# Patient Record
Sex: FEMALE | Race: BLACK OR AFRICAN AMERICAN
[De-identification: names, ages, dates, MRNs, and addresses within clinical notes are randomized per-mention and may not be internally consistent; named-entity substitution may affect disease eponyms.]

---

## 2018-09-13 ENCOUNTER — HOSPITAL ENCOUNTER (EMERGENCY)
Dept: HOSPITAL 5 - ED | Age: 58
Discharge: OTHER INSTITUTION | End: 2018-09-13
Payer: SELF-PAY

## 2018-09-13 VITALS — SYSTOLIC BLOOD PRESSURE: 139 MMHG | DIASTOLIC BLOOD PRESSURE: 91 MMHG

## 2018-09-13 DIAGNOSIS — I60.9: Primary | ICD-10-CM

## 2018-09-13 DIAGNOSIS — I61.8: ICD-10-CM

## 2018-09-13 DIAGNOSIS — I10: ICD-10-CM

## 2018-09-13 LAB
ALBUMIN SERPL-MCNC: 4.1 G/DL (ref 3.9–5)
ALT SERPL-CCNC: 31 UNITS/L (ref 7–56)
APTT BLD: 24.1 SEC. (ref 24.2–36.6)
BAND NEUTROPHILS # (MANUAL): 2 K/MM3
BENZODIAZEPINES SCREEN,URINE: (no result)
BILIRUB UR QL STRIP: (no result)
BLOOD UR QL VISUAL: (no result)
BUN SERPL-MCNC: 13 MG/DL (ref 7–17)
BUN/CREAT SERPL: 22 %
CALCIUM SERPL-MCNC: 9 MG/DL (ref 8.4–10.2)
HCT VFR BLD CALC: 41.3 % (ref 30.3–42.9)
HDLC SERPL-MCNC: 51 MG/DL (ref 40–59)
HEMOLYSIS INDEX: 46
HGB BLD-MCNC: 13.8 GM/DL (ref 10.1–14.3)
INR PPP: 0.95 (ref 0.87–1.13)
MCH RBC QN AUTO: 30 PG (ref 28–32)
MCHC RBC AUTO-ENTMCNC: 33 % (ref 30–34)
MCV RBC AUTO: 89 FL (ref 79–97)
METHADONE SCREEN,URINE: (no result)
MYELOCYTES # (MANUAL): 0 K/MM3
OPIATE SCREEN,URINE: (no result)
PH UR STRIP: 6 [PH] (ref 5–7)
PLATELET # BLD: 303 K/MM3 (ref 140–440)
PROMYELOCYTES # (MANUAL): 0 K/MM3
RBC # BLD AUTO: 4.63 M/MM3 (ref 3.65–5.03)
RBC #/AREA URNS HPF: 7 /HPF (ref 0–6)
TOTAL CELLS COUNTED BLD: 100
UROBILINOGEN UR-MCNC: < 2 MG/DL (ref ?–2)
WBC #/AREA URNS HPF: 2 /HPF (ref 0–6)

## 2018-09-13 PROCEDURE — 85610 PROTHROMBIN TIME: CPT

## 2018-09-13 PROCEDURE — 82550 ASSAY OF CK (CPK): CPT

## 2018-09-13 PROCEDURE — 81001 URINALYSIS AUTO W/SCOPE: CPT

## 2018-09-13 PROCEDURE — 85730 THROMBOPLASTIN TIME PARTIAL: CPT

## 2018-09-13 PROCEDURE — 70450 CT HEAD/BRAIN W/O DYE: CPT

## 2018-09-13 PROCEDURE — 96365 THER/PROPH/DIAG IV INF INIT: CPT

## 2018-09-13 PROCEDURE — 85670 THROMBIN TIME PLASMA: CPT

## 2018-09-13 PROCEDURE — 80053 COMPREHEN METABOLIC PANEL: CPT

## 2018-09-13 PROCEDURE — 86900 BLOOD TYPING SEROLOGIC ABO: CPT

## 2018-09-13 PROCEDURE — 99291 CRITICAL CARE FIRST HOUR: CPT

## 2018-09-13 PROCEDURE — 87070 CULTURE OTHR SPECIMN AEROBIC: CPT

## 2018-09-13 PROCEDURE — 93010 ELECTROCARDIOGRAM REPORT: CPT

## 2018-09-13 PROCEDURE — 96367 TX/PROPH/DG ADDL SEQ IV INF: CPT

## 2018-09-13 PROCEDURE — 82553 CREATINE MB FRACTION: CPT

## 2018-09-13 PROCEDURE — 86901 BLOOD TYPING SEROLOGIC RH(D): CPT

## 2018-09-13 PROCEDURE — 82803 BLOOD GASES ANY COMBINATION: CPT

## 2018-09-13 PROCEDURE — 71045 X-RAY EXAM CHEST 1 VIEW: CPT

## 2018-09-13 PROCEDURE — 85025 COMPLETE CBC W/AUTO DIFF WBC: CPT

## 2018-09-13 PROCEDURE — 80061 LIPID PANEL: CPT

## 2018-09-13 PROCEDURE — 80307 DRUG TEST PRSMV CHEM ANLYZR: CPT

## 2018-09-13 PROCEDURE — 86850 RBC ANTIBODY SCREEN: CPT

## 2018-09-13 PROCEDURE — 31500 INSERT EMERGENCY AIRWAY: CPT

## 2018-09-13 PROCEDURE — 94002 VENT MGMT INPAT INIT DAY: CPT

## 2018-09-13 PROCEDURE — 85007 BL SMEAR W/DIFF WBC COUNT: CPT

## 2018-09-13 PROCEDURE — 84702 CHORIONIC GONADOTROPIN TEST: CPT

## 2018-09-13 PROCEDURE — 36415 COLL VENOUS BLD VENIPUNCTURE: CPT

## 2018-09-13 PROCEDURE — 87205 SMEAR GRAM STAIN: CPT

## 2018-09-13 PROCEDURE — 93005 ELECTROCARDIOGRAM TRACING: CPT

## 2018-09-13 PROCEDURE — 84484 ASSAY OF TROPONIN QUANT: CPT

## 2018-09-13 NOTE — CAT SCAN REPORT
FINAL REPORT



EXAM:  CT HEAD/BRAIN WO CON



HISTORY:  Stroke symptoms 



TECHNIQUE:  Routine axial imaging was obtained of the brain

without IV contrast. There are no previous studies available for

comparison.



FINDINGS:  

There is an extensive subarachnoid hemorrhage involving all the

cisterns and fissures. There is asymmetric amount of blood in the

right sylvian fissure. There is a parenchymal hemorrhage in the

right frontal temporal region of the brain measuring 3.4 cm x 3.1

cm. There is acute blood throughout the entire ventricular system

with dilatation of ventricular system compatible with

communicating hydrocephalus. There is right to left subfalcine

shift. The actual measurement is difficult to assess because of

the tilting of the head in the gantry. There is no extra-axial

blood.



The visualized sinuses are clear. The mastoid air cells are well

pneumatized. There is no evidence of skull fracture.



IMPRESSION:  

Extensive subarachnoid hemorrhage with intraventricular blood and

developing communicating hydrocephalus.



Parenchymal hemorrhage in the right frontal temporal region of

the brain noted also. It is uncertain whether the parenchymal

hemorrhage is the primary cause of the remaining hemorrhage or is

a secondary hemorrhage related to a rupture aneurysm, possibly in

the distribution of the right middle cerebral artery.



The findings were discussed with Dr. Paula at 4:43 a.m.  on

09/13/2018.

## 2018-09-13 NOTE — EMERGENCY DEPARTMENT REPORT
ED Neuro Deficit HPI





- General


Chief Complaint: Altered Mental Status


Stated Complaint: UNRESPONSIVE


Time Seen by Provider: 09/13/18 03:21


Source: family, EMS


Mode of arrival: Stretcher


Limitations: Language Barrier (patient speak [place]ense), Altered Mental Status

, Other (comatose)





- History of Present Illness


Severity: severe





- Related Data


Allergies/Adverse Reactions: 


 Allergies











Allergy/AdvReac Type Severity Reaction Status Date / Time


 


No Known Allergies Allergy   Unverified 09/13/18 03:27














ED Review of Systems


ROS: 


Stated complaint: UNRESPONSIVE


Other details as noted in HPI








ED Past Medical Hx





- Past Medical History


Hx Hypertension: Yes





- Surgical History


Past Surgical History?: No





- Social History


Smoking Status: Never Smoker


Substance Use Type: None





ED Neuro Physical Exam





- General


Limitations: Language Barrier (patient speak [place]), Altered Mental Status

, Other (comatose)


General appearance: obtunded, in distress, other (flexor posturing in upper 

extremities, extensor posturing in feet/legs, sonorous respirations)


Suspected Stroke: Yes





- NIHSS


Assessment Interval: Baseline


1a. Level of Consciousness: coma/unresponsive


1b. LOC Questions: answers no questions correctly


1c. LOC Commands: performs no tasks correctly


2. Best Gaze: forced deviation


3. Visual: bilateral hemianopia


4. Facial Palsy: bilateral complete paralysis


5b. Motor Arm Right: some gravity effort


5a. Motor Arm Left: some gravity effort


6a. Motor Leg Left: no movement


6b. Motor Leg Right: no movement


7. Limb Ataxia: present 2 limbs


8. Sensory: coma/unresponsive


9. Best Language: coma/unresponsive


10. Dysarthria: mute/anarrthric


11. Extinction/Inattention: complete neglect


Total Score: 38


Stroke Severity: Severe Stroke





ED Course





 Vital Signs











  09/13/18 09/13/18 09/13/18





  03:08 03:10 03:13


 


Temperature   98 F


 


Pulse Rate 72  104 H


 


Respiratory 20 18 20





Rate   


 


Blood Pressure   131/86


 


O2 Sat by Pulse 97 100 100





Oximetry   














  09/13/18 09/13/18 09/13/18





  03:16 03:30 03:46


 


Temperature   


 


Pulse Rate 113 H 94 H 103 H


 


Respiratory 28 H 24 25 H





Rate   


 


Blood Pressure 131/86 174/104 141/93


 


O2 Sat by Pulse 99 100 100





Oximetry   














  09/13/18





  04:00


 


Temperature 


 


Pulse Rate 115 H


 


Respiratory 24





Rate 


 


Blood Pressure 141/93


 


O2 Sat by Pulse 100





Oximetry 














- Lab Data


Result diagrams: 


 09/13/18 03:30





 09/13/18 03:30





 Lab Results











  09/13/18 09/13/18 09/13/18 Range/Units





  03:30 03:30 03:30 


 


WBC  28.9 H    (4.5-11.0)  K/mm3


 


RBC  4.63    (3.65-5.03)  M/mm3


 


Hgb  13.8    (10.1-14.3)  gm/dl


 


Hct  41.3    (30.3-42.9)  %


 


MCV  89    (79-97)  fl


 


MCH  30    (28-32)  pg


 


MCHC  33    (30-34)  %


 


RDW  12.7 L    (13.2-15.2)  %


 


Plt Count  303    (140-440)  K/mm3


 


PT   13.1   (12.2-14.9)  Sec.


 


INR   0.95   (0.87-1.13)  


 


APTT   24.1 L   (24.2-36.6)  Sec.


 


Thrombin Time   17.1   (15.1-19.6)  Sec.


 


POC ABG pH     (7.35-7.45)  


 


POC ABG pCO2     (35-45)  


 


POC ABG pO2     ()  


 


POC ABG HCO3     


 


POC ABG Total CO2     


 


POC ABG O2 Sat     


 


POC ABG Base Excess     


 


FiO2     %


 


Sodium     (137-145)  mmol/L


 


Potassium     (3.6-5.0)  mmol/L


 


Chloride     ()  mmol/L


 


Carbon Dioxide     (22-30)  mmol/L


 


Anion Gap     mmol/L


 


BUN     (7-17)  mg/dL


 


Creatinine     (0.7-1.2)  mg/dL


 


Estimated GFR     ml/min


 


BUN/Creatinine Ratio     %


 


Glucose     ()  mg/dL


 


Calcium     (8.4-10.2)  mg/dL


 


Total Bilirubin     (0.1-1.2)  mg/dL


 


ALT     (7-56)  units/L


 


Alkaline Phosphatase     ()  units/L


 


Total Creatine Kinase    124  ()  units/L


 


CK-MB (CK-2)    5.8 H  (0.0-4.0)  ng/mL


 


CK-MB (CK-2) Rel Index    4.6 H  (0-4)  


 


Troponin T    0.070 H  (0.00-0.029)  ng/mL


 


Total Protein     (6.3-8.2)  g/dL


 


Albumin     (3.9-5)  g/dL


 


Albumin/Globulin Ratio     %


 


HCG, Quant     (0-4)  mIU/mL


 


Urine Color     (Yellow)  


 


Urine Turbidity     (Clear)  


 


Urine pH     (5.0-7.0)  


 


Ur Specific Gravity     (1.003-1.030)  


 


Urine Protein     (Negative)  mg/dL


 


Urine Glucose (UA)     (Negative)  mg/dL


 


Urine Ketones     (Negative)  mg/dL


 


Urine Blood     (Negative)  


 


Urine Nitrite     (Negative)  


 


Urine Bilirubin     (Negative)  


 


Urine Urobilinogen     (<2.0)  mg/dL


 


Ur Leukocyte Esterase     (Negative)  


 


Urine WBC (Auto)     (0.0-6.0)  /HPF


 


Urine RBC (Auto)     (0.0-6.0)  /HPF


 


Urine Opiates Screen     


 


Urine Methadone Screen     


 


Ur Barbiturates Screen     


 


Ur Phencyclidine Scrn     


 


Ur Amphetamines Screen     


 


U Benzodiazepines Scrn     


 


Urine Cocaine Screen     


 


U Marijuana (THC) Screen     


 


Drugs of Abuse Note     














  09/13/18 09/13/18 09/13/18 Range/Units





  03:30 03:30 03:40 


 


WBC     (4.5-11.0)  K/mm3


 


RBC     (3.65-5.03)  M/mm3


 


Hgb     (10.1-14.3)  gm/dl


 


Hct     (30.3-42.9)  %


 


MCV     (79-97)  fl


 


MCH     (28-32)  pg


 


MCHC     (30-34)  %


 


RDW     (13.2-15.2)  %


 


Plt Count     (140-440)  K/mm3


 


PT     (12.2-14.9)  Sec.


 


INR     (0.87-1.13)  


 


APTT     (24.2-36.6)  Sec.


 


Thrombin Time     (15.1-19.6)  Sec.


 


POC ABG pH     (7.35-7.45)  


 


POC ABG pCO2     (35-45)  


 


POC ABG pO2     ()  


 


POC ABG HCO3     


 


POC ABG Total CO2     


 


POC ABG O2 Sat     


 


POC ABG Base Excess     


 


FiO2     %


 


Sodium  139    (137-145)  mmol/L


 


Potassium  3.4 L    (3.6-5.0)  mmol/L


 


Chloride  97.4 L    ()  mmol/L


 


Carbon Dioxide  17 L    (22-30)  mmol/L


 


Anion Gap  28    mmol/L


 


BUN  13    (7-17)  mg/dL


 


Creatinine  0.6 L    (0.7-1.2)  mg/dL


 


Estimated GFR  > 60    ml/min


 


BUN/Creatinine Ratio  22    %


 


Glucose  249 H    ()  mg/dL


 


Calcium  9.0    (8.4-10.2)  mg/dL


 


Total Bilirubin  0.30    (0.1-1.2)  mg/dL


 


ALT  31    (7-56)  units/L


 


Alkaline Phosphatase  104    ()  units/L


 


Total Creatine Kinase     ()  units/L


 


CK-MB (CK-2)     (0.0-4.0)  ng/mL


 


CK-MB (CK-2) Rel Index     (0-4)  


 


Troponin T     (0.00-0.029)  ng/mL


 


Total Protein  8.1    (6.3-8.2)  g/dL


 


Albumin  4.1    (3.9-5)  g/dL


 


Albumin/Globulin Ratio  1.0    %


 


HCG, Quant   0.895   (0-4)  mIU/mL


 


Urine Color    Colorless  (Yellow)  


 


Urine Turbidity    Clear  (Clear)  


 


Urine pH    6.0  (5.0-7.0)  


 


Ur Specific Gravity    1.007  (1.003-1.030)  


 


Urine Protein    100 mg/dl  (Negative)  mg/dL


 


Urine Glucose (UA)    >=500  (Negative)  mg/dL


 


Urine Ketones    20  (Negative)  mg/dL


 


Urine Blood    Sm  (Negative)  


 


Urine Nitrite    Neg  (Negative)  


 


Urine Bilirubin    Neg  (Negative)  


 


Urine Urobilinogen    < 2.0  (<2.0)  mg/dL


 


Ur Leukocyte Esterase    Neg  (Negative)  


 


Urine WBC (Auto)    2.0  (0.0-6.0)  /HPF


 


Urine RBC (Auto)    7.0  (0.0-6.0)  /HPF


 


Urine Opiates Screen     


 


Urine Methadone Screen     


 


Ur Barbiturates Screen     


 


Ur Phencyclidine Scrn     


 


Ur Amphetamines Screen     


 


U Benzodiazepines Scrn     


 


Urine Cocaine Screen     


 


U Marijuana (THC) Screen     


 


Drugs of Abuse Note     














  09/13/18 09/13/18 Range/Units





  03:40 04:15 


 


WBC    (4.5-11.0)  K/mm3


 


RBC    (3.65-5.03)  M/mm3


 


Hgb    (10.1-14.3)  gm/dl


 


Hct    (30.3-42.9)  %


 


MCV    (79-97)  fl


 


MCH    (28-32)  pg


 


MCHC    (30-34)  %


 


RDW    (13.2-15.2)  %


 


Plt Count    (140-440)  K/mm3


 


PT    (12.2-14.9)  Sec.


 


INR    (0.87-1.13)  


 


APTT    (24.2-36.6)  Sec.


 


Thrombin Time    (15.1-19.6)  Sec.


 


POC ABG pH   7.442  (7.35-7.45)  


 


POC ABG pCO2   20.9 L  (35-45)  


 


POC ABG pO2   339 H  ()  


 


POC ABG HCO3   14.2  


 


POC ABG Total CO2   15  


 


POC ABG O2 Sat   100  


 


POC ABG Base Excess   -10  


 


FiO2   100  %


 


Sodium    (137-145)  mmol/L


 


Potassium    (3.6-5.0)  mmol/L


 


Chloride    ()  mmol/L


 


Carbon Dioxide    (22-30)  mmol/L


 


Anion Gap    mmol/L


 


BUN    (7-17)  mg/dL


 


Creatinine    (0.7-1.2)  mg/dL


 


Estimated GFR    ml/min


 


BUN/Creatinine Ratio    %


 


Glucose    ()  mg/dL


 


Calcium    (8.4-10.2)  mg/dL


 


Total Bilirubin    (0.1-1.2)  mg/dL


 


ALT    (7-56)  units/L


 


Alkaline Phosphatase    ()  units/L


 


Total Creatine Kinase    ()  units/L


 


CK-MB (CK-2)    (0.0-4.0)  ng/mL


 


CK-MB (CK-2) Rel Index    (0-4)  


 


Troponin T    (0.00-0.029)  ng/mL


 


Total Protein    (6.3-8.2)  g/dL


 


Albumin    (3.9-5)  g/dL


 


Albumin/Globulin Ratio    %


 


HCG, Quant    (0-4)  mIU/mL


 


Urine Color    (Yellow)  


 


Urine Turbidity    (Clear)  


 


Urine pH    (5.0-7.0)  


 


Ur Specific Gravity    (1.003-1.030)  


 


Urine Protein    (Negative)  mg/dL


 


Urine Glucose (UA)    (Negative)  mg/dL


 


Urine Ketones    (Negative)  mg/dL


 


Urine Blood    (Negative)  


 


Urine Nitrite    (Negative)  


 


Urine Bilirubin    (Negative)  


 


Urine Urobilinogen    (<2.0)  mg/dL


 


Ur Leukocyte Esterase    (Negative)  


 


Urine WBC (Auto)    (0.0-6.0)  /HPF


 


Urine RBC (Auto)    (0.0-6.0)  /HPF


 


Urine Opiates Screen  Presumptive negative   


 


Urine Methadone Screen  Presumptive negative   


 


Ur Barbiturates Screen  Presumptive negative   


 


Ur Phencyclidine Scrn  Presumptive negative   


 


Ur Amphetamines Screen  Presumptive negative   


 


U Benzodiazepines Scrn  Presumptive negative   


 


Urine Cocaine Screen  Presumptive negative   


 


U Marijuana (THC) Screen  Presumptive negative   


 


Drugs of Abuse Note  Disclamer   











Critical care attestation.: 


If time is entered above; I have spent that time in minutes in the direct care 

of this critically ill patient, excluding procedure time.








ED Disposition


Clinical Impression: 


 SAH (subarachnoid hemorrhage), Intraparenchymal hematoma of brain





Disposition: DC/TX-[facility]OTHER [facility]


Is pt being admited?: No


Does the pt Need Aspirin: No


Condition: Stable

## 2018-09-13 NOTE — EMERGENCY DEPARTMENT REPORT
ED Altered Mental Status HPI





- General


Stated Complaint: UNRESPONSIVE


Time Seen by Provider: 09/13/18 03:21


Source: family, EMS


Limitations: Language Barrier





- History of Present Illness


Initial Comments: 





Ms. Carl is 57 yo female who presents to ED via EMS for unresponsiveness.  

Patient has hx of HTN not treated.  Son was able to give hx.  Aunt called son 

to inform him that patient went to sleep and did not wake up.  Blood glucose 

238 according to EMS.  No change with naloxone administered.  No hx of tobacco 

or drug abuse. Patient does hard labor in factory.  patient is unresponsive 

comatose.  No response to internal stimuli.  Covered in urine.


MD Complaint: altered mental status, decreased responsiveness


Severity: severe


Context: unknown


Treatments Prior to Arrival: IV fluid, oxygen





- Related Data


 Allergies











Allergy/AdvReac Type Severity Reaction Status Date / Time


 


No Known Allergies Allergy   Unverified 09/13/18 03:27














ED Review of Systems


ROS: 


Stated complaint: UNRESPONSIVE


Other details as noted in HPI





Comment: Unobtainable due to pts medical conditions





ED Past Medical Hx





- Past Medical History


Hx Hypertension: Yes





- Family History


Family history: vascular disease (mother with CVA)





- Social History


Smoking Status: Never Smoker


Substance Use Type: None





ED Physical Exam





- General


Limitations: Language Barrier (patient speak [place]), Altered Mental Status

, Other (comatose)


General appearance: obtunded, in distress, other (flexor posturing in upper 

extremities, extensor posturing in feet/legs, sonorous respirations)





- Head


Head exam: Present: atraumatic, normocephalic





- Eye


Eye exam: Present: other (fixed unreactive pupils 5 mm bilaterally no lid 

reflex no accomodation)





- ENT


ENT exam: Present: normal orophraynx





- Neck


Neck exam: Present: normal inspection





- Respiratory


Respiratory exam: Present: rales, rhonchi, accessory muscle use





- Cardiovascular


Cardiovascular Exam: Present: normal rhythm, irregular rhythm.  Absent: 

systolic murmur, diastolic murmur





- GI/Abdominal


GI/Abdominal exam: Present: soft, other (no distention soft)





- Extremities Exam


Extremities exam: Present: other (extensor posturing)





- Neurological Exam


Neurological exam: Present: other (comatose)





- Psychiatric


Psychiatric exam: Present: other (comatose)





- Skin


Skin exam: Present: pallor, other





- Other


Other exam information: 





no response to sternal rub





ED Course


 Vital Signs











  09/13/18 09/13/18 09/13/18





  03:08 03:10 03:13


 


Temperature   98 F


 


Pulse Rate 72  104 H


 


Respiratory 20 18 20





Rate   


 


Blood Pressure   131/86


 


O2 Sat by Pulse 97 100 100





Oximetry   














  09/13/18 09/13/18 09/13/18





  03:16 03:30 03:46


 


Temperature   


 


Pulse Rate 113 H 94 H 103 H


 


Respiratory 28 H 24 25 H





Rate   


 


Blood Pressure 131/86 174/104 141/93


 


O2 Sat by Pulse 99 100 100





Oximetry   














  09/13/18





  04:00


 


Temperature 


 


Pulse Rate 115 H


 


Respiratory 24





Rate 


 


Blood Pressure 141/93


 


O2 Sat by Pulse 100





Oximetry 














- Intubation


Time Out Performed: Yes


Sedative: Etomidate


Mg Given: 20


Paralytic: Succinylcholine


Mg Given: 100


Laryngoscope: Abisai


Size: 4


ET Tube Size: 7.5


Tube Secured Depth (cm): 22


Tube Secured Location: teeth


Tube Placement Confirmation: visualized tube passing t, equal breath sounds 

bilat, no breath sounds over epi, confirmation by capnometr


Patient Tolerated Procedure: well


Intubation Complications: none





- Lab Data


Result diagrams: 


 09/13/18 03:30





 09/13/18 03:30


 Lab Results











  09/13/18 09/13/18 09/13/18 Range/Units





  03:30 03:30 03:30 


 


WBC  28.9 H    (4.5-11.0)  K/mm3


 


RBC  4.63    (3.65-5.03)  M/mm3


 


Hgb  13.8    (10.1-14.3)  gm/dl


 


Hct  41.3    (30.3-42.9)  %


 


MCV  89    (79-97)  fl


 


MCH  30    (28-32)  pg


 


MCHC  33    (30-34)  %


 


RDW  12.7 L    (13.2-15.2)  %


 


Plt Count  303    (140-440)  K/mm3


 


PT   13.1   (12.2-14.9)  Sec.


 


INR   0.95   (0.87-1.13)  


 


APTT   24.1 L   (24.2-36.6)  Sec.


 


Thrombin Time   17.1   (15.1-19.6)  Sec.


 


POC ABG pH     (7.35-7.45)  


 


POC ABG pCO2     (35-45)  


 


POC ABG pO2     ()  


 


POC ABG HCO3     


 


POC ABG Total CO2     


 


POC ABG O2 Sat     


 


POC ABG Base Excess     


 


FiO2     %


 


Sodium     (137-145)  mmol/L


 


Potassium     (3.6-5.0)  mmol/L


 


Chloride     ()  mmol/L


 


Carbon Dioxide     (22-30)  mmol/L


 


Anion Gap     mmol/L


 


BUN     (7-17)  mg/dL


 


Creatinine     (0.7-1.2)  mg/dL


 


Estimated GFR     ml/min


 


BUN/Creatinine Ratio     %


 


Glucose     ()  mg/dL


 


Calcium     (8.4-10.2)  mg/dL


 


Total Bilirubin     (0.1-1.2)  mg/dL


 


ALT     (7-56)  units/L


 


Alkaline Phosphatase     ()  units/L


 


Total Creatine Kinase    124  ()  units/L


 


CK-MB (CK-2)    5.8 H  (0.0-4.0)  ng/mL


 


CK-MB (CK-2) Rel Index    4.6 H  (0-4)  


 


Troponin T    0.070 H  (0.00-0.029)  ng/mL


 


Total Protein     (6.3-8.2)  g/dL


 


Albumin     (3.9-5)  g/dL


 


Albumin/Globulin Ratio     %


 


HCG, Quant     (0-4)  mIU/mL


 


Urine Color     (Yellow)  


 


Urine Turbidity     (Clear)  


 


Urine pH     (5.0-7.0)  


 


Ur Specific Gravity     (1.003-1.030)  


 


Urine Protein     (Negative)  mg/dL


 


Urine Glucose (UA)     (Negative)  mg/dL


 


Urine Ketones     (Negative)  mg/dL


 


Urine Blood     (Negative)  


 


Urine Nitrite     (Negative)  


 


Urine Bilirubin     (Negative)  


 


Urine Urobilinogen     (<2.0)  mg/dL


 


Ur Leukocyte Esterase     (Negative)  


 


Urine WBC (Auto)     (0.0-6.0)  /HPF


 


Urine RBC (Auto)     (0.0-6.0)  /HPF


 


Urine Opiates Screen     


 


Urine Methadone Screen     


 


Ur Barbiturates Screen     


 


Ur Phencyclidine Scrn     


 


Ur Amphetamines Screen     


 


U Benzodiazepines Scrn     


 


Urine Cocaine Screen     


 


U Marijuana (THC) Screen     


 


Drugs of Abuse Note     














  09/13/18 09/13/18 09/13/18 Range/Units





  03:30 03:30 03:40 


 


WBC     (4.5-11.0)  K/mm3


 


RBC     (3.65-5.03)  M/mm3


 


Hgb     (10.1-14.3)  gm/dl


 


Hct     (30.3-42.9)  %


 


MCV     (79-97)  fl


 


MCH     (28-32)  pg


 


MCHC     (30-34)  %


 


RDW     (13.2-15.2)  %


 


Plt Count     (140-440)  K/mm3


 


PT     (12.2-14.9)  Sec.


 


INR     (0.87-1.13)  


 


APTT     (24.2-36.6)  Sec.


 


Thrombin Time     (15.1-19.6)  Sec.


 


POC ABG pH     (7.35-7.45)  


 


POC ABG pCO2     (35-45)  


 


POC ABG pO2     ()  


 


POC ABG HCO3     


 


POC ABG Total CO2     


 


POC ABG O2 Sat     


 


POC ABG Base Excess     


 


FiO2     %


 


Sodium  139    (137-145)  mmol/L


 


Potassium  3.4 L    (3.6-5.0)  mmol/L


 


Chloride  97.4 L    ()  mmol/L


 


Carbon Dioxide  17 L    (22-30)  mmol/L


 


Anion Gap  28    mmol/L


 


BUN  13    (7-17)  mg/dL


 


Creatinine  0.6 L    (0.7-1.2)  mg/dL


 


Estimated GFR  > 60    ml/min


 


BUN/Creatinine Ratio  22    %


 


Glucose  249 H    ()  mg/dL


 


Calcium  9.0    (8.4-10.2)  mg/dL


 


Total Bilirubin  0.30    (0.1-1.2)  mg/dL


 


ALT  31    (7-56)  units/L


 


Alkaline Phosphatase  104    ()  units/L


 


Total Creatine Kinase     ()  units/L


 


CK-MB (CK-2)     (0.0-4.0)  ng/mL


 


CK-MB (CK-2) Rel Index     (0-4)  


 


Troponin T     (0.00-0.029)  ng/mL


 


Total Protein  8.1    (6.3-8.2)  g/dL


 


Albumin  4.1    (3.9-5)  g/dL


 


Albumin/Globulin Ratio  1.0    %


 


HCG, Quant   0.895   (0-4)  mIU/mL


 


Urine Color    Colorless  (Yellow)  


 


Urine Turbidity    Clear  (Clear)  


 


Urine pH    6.0  (5.0-7.0)  


 


Ur Specific Gravity    1.007  (1.003-1.030)  


 


Urine Protein    100 mg/dl  (Negative)  mg/dL


 


Urine Glucose (UA)    >=500  (Negative)  mg/dL


 


Urine Ketones    20  (Negative)  mg/dL


 


Urine Blood    Sm  (Negative)  


 


Urine Nitrite    Neg  (Negative)  


 


Urine Bilirubin    Neg  (Negative)  


 


Urine Urobilinogen    < 2.0  (<2.0)  mg/dL


 


Ur Leukocyte Esterase    Neg  (Negative)  


 


Urine WBC (Auto)    2.0  (0.0-6.0)  /HPF


 


Urine RBC (Auto)    7.0  (0.0-6.0)  /HPF


 


Urine Opiates Screen     


 


Urine Methadone Screen     


 


Ur Barbiturates Screen     


 


Ur Phencyclidine Scrn     


 


Ur Amphetamines Screen     


 


U Benzodiazepines Scrn     


 


Urine Cocaine Screen     


 


U Marijuana (THC) Screen     


 


Drugs of Abuse Note     














  09/13/18 09/13/18 Range/Units





  03:40 04:15 


 


WBC    (4.5-11.0)  K/mm3


 


RBC    (3.65-5.03)  M/mm3


 


Hgb    (10.1-14.3)  gm/dl


 


Hct    (30.3-42.9)  %


 


MCV    (79-97)  fl


 


MCH    (28-32)  pg


 


MCHC    (30-34)  %


 


RDW    (13.2-15.2)  %


 


Plt Count    (140-440)  K/mm3


 


PT    (12.2-14.9)  Sec.


 


INR    (0.87-1.13)  


 


APTT    (24.2-36.6)  Sec.


 


Thrombin Time    (15.1-19.6)  Sec.


 


POC ABG pH   7.442  (7.35-7.45)  


 


POC ABG pCO2   20.9 L  (35-45)  


 


POC ABG pO2   339 H  ()  


 


POC ABG HCO3   14.2  


 


POC ABG Total CO2   15  


 


POC ABG O2 Sat   100  


 


POC ABG Base Excess   -10  


 


FiO2   100  %


 


Sodium    (137-145)  mmol/L


 


Potassium    (3.6-5.0)  mmol/L


 


Chloride    ()  mmol/L


 


Carbon Dioxide    (22-30)  mmol/L


 


Anion Gap    mmol/L


 


BUN    (7-17)  mg/dL


 


Creatinine    (0.7-1.2)  mg/dL


 


Estimated GFR    ml/min


 


BUN/Creatinine Ratio    %


 


Glucose    ()  mg/dL


 


Calcium    (8.4-10.2)  mg/dL


 


Total Bilirubin    (0.1-1.2)  mg/dL


 


ALT    (7-56)  units/L


 


Alkaline Phosphatase    ()  units/L


 


Total Creatine Kinase    ()  units/L


 


CK-MB (CK-2)    (0.0-4.0)  ng/mL


 


CK-MB (CK-2) Rel Index    (0-4)  


 


Troponin T    (0.00-0.029)  ng/mL


 


Total Protein    (6.3-8.2)  g/dL


 


Albumin    (3.9-5)  g/dL


 


Albumin/Globulin Ratio    %


 


HCG, Quant    (0-4)  mIU/mL


 


Urine Color    (Yellow)  


 


Urine Turbidity    (Clear)  


 


Urine pH    (5.0-7.0)  


 


Ur Specific Gravity    (1.003-1.030)  


 


Urine Protein    (Negative)  mg/dL


 


Urine Glucose (UA)    (Negative)  mg/dL


 


Urine Ketones    (Negative)  mg/dL


 


Urine Blood    (Negative)  


 


Urine Nitrite    (Negative)  


 


Urine Bilirubin    (Negative)  


 


Urine Urobilinogen    (<2.0)  mg/dL


 


Ur Leukocyte Esterase    (Negative)  


 


Urine WBC (Auto)    (0.0-6.0)  /HPF


 


Urine RBC (Auto)    (0.0-6.0)  /HPF


 


Urine Opiates Screen  Presumptive negative   


 


Urine Methadone Screen  Presumptive negative   


 


Ur Barbiturates Screen  Presumptive negative   


 


Ur Phencyclidine Scrn  Presumptive negative   


 


Ur Amphetamines Screen  Presumptive negative   


 


U Benzodiazepines Scrn  Presumptive negative   


 


Urine Cocaine Screen  Presumptive negative   


 


U Marijuana (THC) Screen  Presumptive negative   


 


Drugs of Abuse Note  Disclamer   














09/13/18 04:16


EKG obtained 0357


NSr rate 95 bpm nl axis prolonged QT interval ST depression inferior 

anterolatera leads, ST elevation 1 mm AVR 





- Medical Decision Making





Ms. Carl presents in comatose state with flexor posturing of the upper 

extremities and extensor posturing of the lower extremities.  





She has extensive ICH with SAH and hydrocephalus.  +intraparenchymal hemorrhage.





Dr. Trejo neurosurgeon accepted patient to [facility].  Dr. Rosenberg neurointensivist also was included on the consultation.  Neurointensivist 

recommended Keppra and amicar which was initiated in the ED.





Will attempt to fly patient if possible, will need to be transferred as soon as 

possible for ventriculostomy





Transferred in critical condition


Critical Care Time: Yes


Critical care time in (mins) excluding proc time.: 50


Critical care attestation.: 


If time is entered above; I have spent that time in minutes in the direct care 

of this critically ill patient, excluding procedure time.


50 minutes of critical care time excluding procedures were used in the care of 

the patient.  Patient required multiple assessments interventions.  I kept the 

The son informed.  I spoke with transfer nurse and consultants: radiologist, 

neurosurgeon, neurointensivist.  





ED Disposition


Clinical Impression: 


 SAH (subarachnoid hemorrhage), Intraparenchymal hematoma of brain





Disposition: [id number]/TX-[id number] ANOTHER TYPE HLTHCARE


Is pt being admited?: No


Does the pt Need Aspirin: No


Condition: Stable


Time of Disposition: 05:03

## 2018-09-13 NOTE — XRAY REPORT
FINAL REPORT



EXAM:  XR CHEST 1V AP



HISTORY:  Post ETT placement 



COMPARISON:  None available. 



FINDINGS:  

Frontal view(s) of the chest obtained. Heart borderline enlarged.

ETT is present. Distal tip 1.2 centimeters from the patricia. NG

tube is present. NG tube is coiled within the mid stomach. Distal

tip not visualized. 



Lungs are clear. No focal consolidation. No pneumothorax. 



IMPRESSION:  

ETT is present. Distal tip approximately 1.2 centimeters from the

patricia. NG tube is present. Distal tip is not visualized but

extends at least to the mid stomach. 



Lungs are clear.